# Patient Record
Sex: OTHER/UNKNOWN | URBAN - METROPOLITAN AREA
[De-identification: names, ages, dates, MRNs, and addresses within clinical notes are randomized per-mention and may not be internally consistent; named-entity substitution may affect disease eponyms.]

---

## 2019-07-07 ENCOUNTER — HOSPITAL ENCOUNTER (OUTPATIENT)
Facility: CLINIC | Age: 84
End: 2019-07-07

## 2019-07-07 RX ORDER — MIDAZOLAM (PF) 1 MG/ML IN 0.9 % SODIUM CHLORIDE INTRAVENOUS SOLUTION
1-8 CONTINUOUS
Status: CANCELLED | OUTPATIENT
Start: 2019-07-07

## 2019-07-07 RX ORDER — LIDOCAINE HYDROCHLORIDE ANHYDROUS AND DEXTROSE MONOHYDRATE .8; 5 G/100ML; G/100ML
1-4 INJECTION, SOLUTION INTRAVENOUS CONTINUOUS
Status: CANCELLED | OUTPATIENT
Start: 2019-07-07

## 2019-07-07 RX ORDER — HEPARIN SODIUM (PORCINE) LOCK FLUSH IV SOLN 100 UNIT/ML 100 UNIT/ML
5-10 SOLUTION INTRAVENOUS EVERY 30 MIN PRN
Status: CANCELLED | OUTPATIENT
Start: 2019-07-07

## 2019-07-07 RX ORDER — HEPARIN SODIUM 10000 [USP'U]/100ML
100-1000 INJECTION, SOLUTION INTRAVENOUS CONTINUOUS PRN
Status: CANCELLED | OUTPATIENT
Start: 2019-07-07

## 2019-07-07 RX ORDER — ARGATROBAN 1 MG/ML
150 INJECTION, SOLUTION INTRAVENOUS
Status: CANCELLED | OUTPATIENT
Start: 2019-07-07

## 2019-07-07 RX ORDER — FENTANYL CITRATE 50 UG/ML
50 INJECTION, SOLUTION INTRAMUSCULAR; INTRAVENOUS EVERY 30 MIN PRN
Status: CANCELLED | OUTPATIENT
Start: 2019-07-07

## 2019-07-07 RX ORDER — NOREPINEPHRINE BITARTRATE 0.06 MG/ML
.03-.4 INJECTION, SOLUTION INTRAVENOUS CONTINUOUS PRN
Status: CANCELLED | OUTPATIENT
Start: 2019-07-07

## 2019-07-07 RX ORDER — ARGATROBAN 1 MG/ML
350 INJECTION, SOLUTION INTRAVENOUS
Status: CANCELLED | OUTPATIENT
Start: 2019-07-07

## 2019-09-16 ENCOUNTER — HOSPITAL ENCOUNTER (EMERGENCY)
Facility: HOSPITAL | Age: 84
End: 2019-09-16

## 2019-10-23 ENCOUNTER — HOSPITAL ENCOUNTER (EMERGENCY)
Facility: OTHER | Age: 84
Discharge: STILL ADMITTED OR ADMINISTRATIVE | End: 2019-10-23

## 2023-02-03 ENCOUNTER — HOSPITAL ENCOUNTER (EMERGENCY)
Facility: CLINIC | Age: 88
End: 2023-02-03

## 2024-08-15 ENCOUNTER — HOSPITAL ENCOUNTER (EMERGENCY)
Facility: CLINIC | Age: 89
Discharge: HOME | End: 2024-08-16

## 2024-08-15 ASSESSMENT — ACTIVITIES OF DAILY LIVING (ADL)
ADLS_ACUITY_SCORE: 35

## 2024-08-16 ASSESSMENT — ACTIVITIES OF DAILY LIVING (ADL)
ADLS_ACUITY_SCORE: 35

## 2025-01-01 ENCOUNTER — HOSPITAL ENCOUNTER (EMERGENCY)
Facility: CLINIC | Age: 89
End: 2025-06-23
Attending: EMERGENCY MEDICINE | Admitting: EMERGENCY MEDICINE

## 2025-01-01 DIAGNOSIS — I46.9 CARDIAC ARREST WITH PULSELESS ELECTRICAL ACTIVITY (H): ICD-10-CM

## 2025-01-01 LAB
BASE EXCESS BLDV CALC-SCNC: ABNORMAL MMOL/L
BASE EXCESS BLDV CALC-SCNC: ABNORMAL MMOL/L
CA-I BLD-MCNC: 4.3 MG/DL (ref 4.4–5.2)
CPB POCT: NO
GLUCOSE BLD-MCNC: 283 MG/DL (ref 70–99)
GLUCOSE BLDC GLUCOMTR-MCNC: 303 MG/DL (ref 70–99)
HBV SURFACE AG SERPL QL IA: NONREACTIVE
HCO3 BLDV-SCNC: <1 MMOL/L (ref 21–28)
HCO3 BLDV-SCNC: <1 MMOL/L (ref 21–28)
HCT VFR BLD CALC: 38 % (ref 40–53)
HGB BLD-MCNC: 12.9 G/DL (ref 13.3–17.7)
HIV 1+2 AB+HIV1 P24 AG SERPL QL IA: NONREACTIVE
LACTATE BLD-SCNC: 12.4 MMOL/L (ref 0.7–2)
PCO2 BLDV: >108 MM HG (ref 40–50)
PCO2 BLDV: >108 MM HG (ref 40–50)
PH BLDV: 6.82 [PH] (ref 7.32–7.43)
PH BLDV: 6.83 [PH] (ref 7.32–7.43)
PO2 BLDV: 16 MM HG (ref 25–47)
PO2 BLDV: 17 MM HG (ref 25–47)
POTASSIUM BLD-SCNC: 4.7 MMOL/L (ref 3.4–5.3)
SAO2 % BLDV: ABNORMAL %
SAO2 % BLDV: ABNORMAL %
SODIUM BLD-SCNC: 138 MMOL/L (ref 135–145)
TROPONIN I BLD-MCNC: 0 UG/L

## 2025-01-01 PROCEDURE — 83605 ASSAY OF LACTIC ACID: CPT

## 2025-01-01 PROCEDURE — 82962 GLUCOSE BLOOD TEST: CPT

## 2025-01-01 PROCEDURE — 999N000104 HC STATISTIC NO CHARGE: Performed by: EMERGENCY MEDICINE

## 2025-01-01 PROCEDURE — 96375 TX/PRO/DX INJ NEW DRUG ADDON: CPT | Performed by: EMERGENCY MEDICINE

## 2025-01-01 PROCEDURE — 96374 THER/PROPH/DIAG INJ IV PUSH: CPT | Performed by: EMERGENCY MEDICINE

## 2025-01-01 PROCEDURE — 250N000011 HC RX IP 250 OP 636: Performed by: EMERGENCY MEDICINE

## 2025-01-01 PROCEDURE — 82330 ASSAY OF CALCIUM: CPT

## 2025-01-01 PROCEDURE — 99291 CRITICAL CARE FIRST HOUR: CPT | Performed by: EMERGENCY MEDICINE

## 2025-01-01 PROCEDURE — 84484 ASSAY OF TROPONIN QUANT: CPT

## 2025-01-01 PROCEDURE — 250N000009 HC RX 250: Performed by: EMERGENCY MEDICINE

## 2025-01-01 PROCEDURE — 82803 BLOOD GASES ANY COMBINATION: CPT

## 2025-01-01 PROCEDURE — 99291 CRITICAL CARE FIRST HOUR: CPT | Mod: 25 | Performed by: EMERGENCY MEDICINE

## 2025-01-01 RX ORDER — INDOMETHACIN 25 MG/1
50 CAPSULE ORAL ONCE
Status: COMPLETED | OUTPATIENT
Start: 2025-01-01 | End: 2025-01-01

## 2025-01-01 RX ORDER — NALOXONE HYDROCHLORIDE 0.4 MG/ML
1 INJECTION, SOLUTION INTRAMUSCULAR; INTRAVENOUS; SUBCUTANEOUS ONCE
Status: COMPLETED | OUTPATIENT
Start: 2025-01-01 | End: 2025-01-01

## 2025-01-01 RX ORDER — EPINEPHRINE 0.1 MG/ML
1 INJECTION INTRAVENOUS ONCE
Status: COMPLETED | OUTPATIENT
Start: 2025-01-01 | End: 2025-01-01

## 2025-01-01 RX ADMIN — NALOXONE HYDROCHLORIDE 1 MG: 0.4 INJECTION, SOLUTION INTRAMUSCULAR; INTRAVENOUS; SUBCUTANEOUS at 12:25

## 2025-01-01 RX ADMIN — SODIUM BICARBONATE 50 MEQ: 84 INJECTION INTRAVENOUS at 12:25

## 2025-01-01 RX ADMIN — EPINEPHRINE 1 MG: 0.1 INJECTION INTRACARDIAC; INTRAVENOUS at 12:25

## 2025-01-01 ASSESSMENT — ACTIVITIES OF DAILY LIVING (ADL)
ADLS_ACUITY_SCORE: 41

## 2025-04-26 ENCOUNTER — HOSPITAL ENCOUNTER (EMERGENCY)
Facility: CLINIC | Age: 89
Discharge: LEFT BEFORE DISCHARGE | End: 2025-04-26
Admitting: EMERGENCY MEDICINE

## 2025-04-26 PROCEDURE — 99281 EMR DPT VST MAYX REQ PHY/QHP: CPT | Performed by: EMERGENCY MEDICINE

## 2025-04-26 ASSESSMENT — ACTIVITIES OF DAILY LIVING (ADL): ADLS_ACUITY_SCORE: 41

## 2025-04-26 NOTE — ED NOTES
Bed: ED02  Expected date:   Expected time:   Means of arrival:   Comments:  H421  37M  Hand/foot injury

## 2025-06-23 NOTE — ED PROVIDER NOTES
ED Provider Note  [facility]      History     Chief Complaint   Patient presents with    Cardiac Arrest     The history is limited by the condition of the patient.     Vicky Agrawal is a 89 year old male with unknown past medical history who arrives in the ED via [place] [facility] with cardiac arrest.     Per EMS, the patient found down outside by bystanders, with suspected concerns for overdose by neighbors. Upon EMS arrival, they noted PEA the entire time prior to arrival (approximately 45 minutes). En route, the patient was given 1 dose of intranasal narcan, 3 doses of epi, and one dose of sodium bicarb. EMS noted some emesis, which was suctioned prior to intubation en route. EMS noted little activity on ultrasound, but still had PEA.  No evidence of CPR prior to EMS arrival.  Per EMS pt appears to be homeless and was in a sleeping bag behind some bushes.    Physical Exam      Physical Exam  General: patient intubated and unresponsive   head: atraumatic and normocephalic   EENT: moist mucus membranes, pupils are 4 mm, equal and unreactive   Cardiovascular:  Distal extremities are cool, no palpable pulses   pulmonary: Breath sounds are symmetric with assisted ventilation   abdomen: soft, nondistended  Musculoskeletal: No gross deformity  Neurological: GCS 3 T, pupils are symmetric and unreactive, no rigidity in the extremities   skin: warm, dry       ED Course, Procedures, & Data      Procedures         Lactic acid elevated due to cardiac arrest. At this time there are no signs of sepsis or septic shock          Medications - No data to display       Critical Care Addendum  My initial assessment, based on my review of prehospital provider report, review of nursing observations, review of vital signs, focused history, physical exam, and discussion with cardiology, established a high suspicion that Vicky Agrawal has cardiac arrest, which requires immediate  intervention, and therefore he is critically ill.     After the initial assessment, the care team initiated multiple lab tests, initiated medication therapy with epinephrine, narcan, bicarb, and consulted with cardiology to provide stabilization care. Due to the critical nature of this patient, I reassessed nursing observations, vital signs, review of cardiac rhythm monitor, and neurologic status multiple times prior to his disposition.     Time also spent performing documentation, reviewing test results, discussion with consultants, and coordination of care.     Critical care time (excluding teaching time and procedures): 40 minutes.       Assessment & Plan    Patient is an anonymous male who was found down by bystanders with unknown total downtime but at least 45 minutes prior to arrival in the emergency department.  He is currently intubated and ET tube was confirmed with bilateral breath sounds and fogging within the tube.  Point-of-care glucose was 303.  On bedside cardiac ultrasound he has no cardiac activity.  He was given additional IV epinephrine, sodium bicarb and IV Narcan without any change.  Point-of-care labs show a pH of 6.8, PCO2 of greater than 130, lactate of 12.  No significant electrolyte abnormalities.  He was evaluated by cardiology and is not felt to be an ECMO candidate.  On recheck rhythm check, he remained in PEA and then asystole.  Repeat cardiac ultrasound shows cardiac standstill.  Resuscitation attempts were made for over an hour in total without any response.  Time of death was called at 12:29p.    I have reviewed the nursing notes. I have reviewed the findings, diagnosis, plan and need for follow up with the patient.    New Prescriptions    No medications on file       Final diagnoses:   Cardiac arrest with pulseless electrical activity (H)       Cathy ALEX, am serving as a trained medical scribe to document services personally performed by Sheri Madrid MD based on the  provider's statements to me on June 23, 2025.  This document has been checked and approved by the attending provider.    I, Sheri Madrid MD, was physically present and have reviewed and verified the accuracy of this note documented by Cathy Moore, medical scribe.      Sheri Madrid MD  [facility] EMERGENCY DEPARTMENT  6/23/2025     Sheri Madrid MD  06/23/25 1658

## 2025-06-23 NOTE — PROGRESS NOTES
Pt arrived on bob by ambulance found down outside apartment complex, [address] [place], wrapped in a sleeping bag.   ET tube in place. RT bedside. 1225 1mg IV narcan, 50mEq NaBicarb, 1 mg epinephrine PIV admin. No cardiac activity on femoral pulse check and bedside cardiac ultrasound. Bedside MD time of death called at 1229.

## 2025-06-23 NOTE — PROGRESS NOTES
"Brief Social Work Note    Writer consulted to assist with identifying this person who was found in without a pulse. EMS staff was here in the emergency department; this writer spoke to staff Ace who states that there was a woman knocking on doors looking for narcan who identified this patient as \"Keshav.\" However, no additional information available, no belongings on the patient's person.    Writer contacted the business next to where this person was found- [employer]- at [phone] to ask whether there was a known unhoused presence in the area. The business  said that there is a known population that hangs around there who are frequently in communication with the police. This writer then called the [place] police department at [phone] and left a message with the \"Homeless Assistance Response Team\" to see whether they might have additional information.    Writer and charge nurse did gather some identifying photos of tattoos on each shoulder. Because this person was already in a body bag, we did not look for additional tattoos on other parts of his body.      Right Shoulder      Left Shoulder    Writer has not yet heard back from [facility] team. The patient was transferred to the [facility]. Waiting on call back from this team.    ________________    EMRE Mendieta, LGSW  ED/Observation   [facility]  Phone: [phone]  Fax: [phone]    After hours "[employer] and After Hours [employer] [place]  Available from 4:00pm - 8:30pm        "

## 2025-06-23 NOTE — PROGRESS NOTES
Brief Cardiology Note  Paged to ED in setting of ongoing resuscitation efforts following OOHCA for Vicky Ray [place]; PEA arrest for at least 45 minutes, no clear evidence of bystander CPR prior to EMS arrival. I evaluated the patient at the bedside for potential ECPR in collaboration with the on-call ECPR staff and standard guidelines. This patient is not eligible for ECPR due to the following concerns: prolonged duration of cardiac arrest, sustained PEA.    The patient was discussed with Dr. Hill, on-call VT/VF interventionalist, at the bedside.    Pasquale Kendall MD  Cardiology Fellow    June 23, 2025

## 2025-06-23 NOTE — ED TRIAGE NOTES
BIBA in cardiac arrest. EMS stated pt friend was looking for narcan for approx. 15 min before EMS arrival. 3 dose epi, 1 sodium bicarb, 1 dose nasal narcan admin by EMS.

## 2025-07-21 ENCOUNTER — HOSPITAL ENCOUNTER (EMERGENCY)
Facility: CLINIC | Age: 89
Discharge: HOME | End: 2025-07-22

## 2025-07-21 PROCEDURE — 99281 EMR DPT VST MAYX REQ PHY/QHP: CPT

## 2025-07-21 ASSESSMENT — ACTIVITIES OF DAILY LIVING (ADL)
ADLS_ACUITY_SCORE: 41

## 2025-07-21 NOTE — PROGRESS NOTES
"ED ECMO Primary RN Nursing Note     Patient Name:  Rashmi Calvillo [place]    Medical Record Number: [id number]  Today's Date:  July 21, 2025   Procedure:  Patient to be started on ECMO secondary to refractory v-fib arrest    Patient arrived to ED Time:    ***, with Guillermo machine running    Patient Hx from EMS report:     ***       Last known \"Well\" time: ***     Time of 911 call: ***      Was bystander CPR performed:     ***         EMS arrival to scene time: ***     Number of shocks given prior to arrival: ***     EMS medications given prior to arrival: ***, ***, ***, ***     Time of last EMS medication: ***     Type and size of airway on arrival: ***     Rhythm on monitor at arrival: ***     Type and location of vascular access on arrival: ***        ECMO MD arrival time:  ***    ECMO Team arrival time: ***      MD first needle stick time:  ***    First istat lactate (venous or arterial) drawn at: ***         Results @: *** : PH=      ***                     pCO2=      ***                      pO2=      ***                     Bicarb=      ***                     O2sat %=      ***                       Lactic Acid=__*** __     First istat electrolyte (E3+) drawn at: ***         Results @: *** : Na+=      ***                   K+=      ***                   Hgb=      ***                   Hct=      ***                           First istat ACT drawn at: ***         Results @: *** : ACT=      ***                       Venous groin cannula #1 placed at: ***      ***  size   Location:     ***         Arterial groin cannula #2 placed at: ***      *** size   Location:     ***           ECMO \"PUMP ON\" time: ***      Reperfusion line to leg placed @ ***      Venous access line placed at: ***   size of line: __***___, number of lumens:    ***         Location:     ***         Arterial access line placed at: *** size of line: __***___, number of lumens:    ***           Location:     ***           Guillermo stopped @: " ***    Distal perfusion line placed at:     ***        Location:     ***         Intubation:     Sedation medications given:     ***     mg of   ***     @    ***       ,       ***     mg of   ***     @    ***       ,      Tube size:      ***      ,  Secured:     ***      cm, at the:         ***           .    Intubation performed by:        ***                  .        Defib shock given @:      ***       Joules:    ***      Rhythm after shock:    ***               Defib shock given @:      ***       Joules:    ***      Rhythm after shock:    ***               Defib shock given @:      ***       Joules:    ***      Rhythm after shock:    ***               Defib shock given @:      ***       Joules:    ***      Rhythm after shock:    ***                   Medications given during procedure:        Heparin bolus:    ***     Units @:    ***              0.9% NS bolus:    ***     mL @:    ***          0.9% NS bolus:    ***     mL @:    ***          0.9% NS bolus:    ***     mL @:    ***                Epi bolus:    ***     mg @:    ***          Epi bolus:    ***     mg @:    ***          Epi bolus:    ***     mg @:    ***          Epi bolus:    ***     mg @:    ***                Sodium Bicarb bolus:    ***     amp @:    ***          Sodium Bicarb bolus:    ***     amp @:    ***          Sodium Bicarb bolus:    ***     amp @:    ***            Atropine bolus:    ***     mg @:    ***          Atropine bolus:    ***     mg @:    ***          Atropine bolus:    ***     mg @:    ***              Vasopressin drip started @:    ***         Rate @:    ***          Vasopressin drip titrated @:    ***         Rate @:    ***          Vasopressin drip titrated @:    ***         Rate @:    ***          Vasopressin drip titrated @:    ***         Rate @:    ***              Levophed drip started @:    ***         Rate @:    ***          Levophed drip titrated @:    ***         Rate @:    ***          Levophed drip titrated @:    ***  "        Rate @:    ***          Levophed drip titrated @:    ***         Rate @:    ***              Epi drip started @:    ***         Rate @:    ***          EPi drip titrated @:    ***         Rate @:    ***          Epi drip titrated @:    ***         Rate @:    ***          Epi drip titrated @:    ***         Rate @:    ***            OTHER meds given:        ***         given    @:    ***         Dose :    ***          ***         given    @:    ***         Dose :    ***          ***         given    @:    ***         Dose :    ***          ***         given    @:    ***         Dose :    ***         Additional istat results:    istat lactate drawn at: ***         Results @: *** :  PH=      ***                  pCO2=      ***                  pO2=      ***                  Bicarb=      ***                  O2sat %=      ***                    Lactic Acid=__*** __     istat electrolyte (E3+) drawn at: ***         Results @: *** :  Na+=      ***                   K+=      ***                   Hgb=      ***                   Hct=      ***                           istat ACT drawn at: ***         Results @: *** : ACT=      ***            istat ACT drawn at: ***         Results @: *** : ACT=      ***            istat ACT drawn at: ***         Results @: *** : ACT=      ***                        Arrival time to \"Pump On\" time TOTAL:     ***       minutes      Patient Transported to CV lab at: ***      Report given to: ***      Redd Pradhan RN   "

## 2025-07-22 ASSESSMENT — ACTIVITIES OF DAILY LIVING (ADL)
ADLS_ACUITY_SCORE: 41